# Patient Record
Sex: MALE | Race: WHITE | NOT HISPANIC OR LATINO | Employment: OTHER | URBAN - METROPOLITAN AREA
[De-identification: names, ages, dates, MRNs, and addresses within clinical notes are randomized per-mention and may not be internally consistent; named-entity substitution may affect disease eponyms.]

---

## 2022-02-16 ENCOUNTER — HOSPITAL ENCOUNTER (EMERGENCY)
Facility: HOSPITAL | Age: 52
Discharge: HOME | End: 2022-02-16
Attending: EMERGENCY MEDICINE | Admitting: EMERGENCY MEDICINE

## 2022-02-16 ENCOUNTER — APPOINTMENT (OUTPATIENT)
Dept: GENERAL RADIOLOGY | Facility: HOSPITAL | Age: 52
End: 2022-02-16

## 2022-02-16 VITALS
BODY MASS INDEX: 29.35 KG/M2 | SYSTOLIC BLOOD PRESSURE: 172 MMHG | OXYGEN SATURATION: 98 % | WEIGHT: 216.71 LBS | HEART RATE: 79 BPM | HEIGHT: 72 IN | RESPIRATION RATE: 18 BRPM | TEMPERATURE: 97.9 F | DIASTOLIC BLOOD PRESSURE: 96 MMHG

## 2022-02-16 DIAGNOSIS — S43.402A SPRAIN OF LEFT SHOULDER, INITIAL ENCOUNTER: Primary | ICD-10-CM

## 2022-02-16 PROCEDURE — 73030 X-RAY EXAM OF SHOULDER: CPT

## 2022-02-16 PROCEDURE — 99282 EMERGENCY DEPT VISIT SF MDM: CPT

## 2022-02-16 RX ORDER — METAXALONE 800 MG/1
TABLET ORAL
Qty: 15 TABLET | Refills: 0 | OUTPATIENT
Start: 2022-02-16 | End: 2022-04-02

## 2022-02-16 RX ORDER — PREDNISONE 10 MG/1
30 TABLET ORAL DAILY
Qty: 15 TABLET | Refills: 0 | OUTPATIENT
Start: 2022-02-16 | End: 2022-04-02

## 2022-02-16 NOTE — DISCHARGE INSTRUCTIONS
Use sling for the next 3 days  You can also use Tylenol or ibuprofen for pain  Avoid heavy lifting or pulling for the next 5 days  Follow-up with an orthopedic physician for continued problems, you may need an MRI if symptoms persist

## 2022-02-16 NOTE — ED PROVIDER NOTES
Subjective   51-year-old male states that he has pain in his shoulder.  He states it started after he slept on a cot at a relatives house.  He reports that he has had no decrease in sensation or circulation reports that he has had no impact trauma to the area.  He denies neck pain or stiffness.  He states he did research on the Internet and decided that he has a torn rotator cuff          Review of Systems   Musculoskeletal: Positive for arthralgias and joint swelling.   Neurological: Negative for tremors and numbness.   All other systems reviewed and are negative.      No past medical history on file.  No history of previous left shoulder surgery.  Right-hand-dominant  No Known Allergies    No past surgical history on file.    No family history on file.    Social History     Socioeconomic History   • Marital status: Single           Objective   Physical Exam  Nontoxic [place] Coma Scale 15  HEENT: Head is normocephalic atraumatic  Neck: Supple nontender no paresthesia with pressure  Shoulder/left upper extremity: The patient has diffuse discomfort noted on the superior portion of the shoulder.  There is no clinically apparent AC separation nor is there pain with pressure on the supraspinatus area although that is the location of the discomfort perceived by the patient.  There is no tenderness noted over the long head of the biceps.  The patient is able to maintain abduction without assistance at 90 and greater than 90 degrees.  He is neurovascular intact distally with symmetrical axillary and deltoid nerve function  Procedures       Patient was placed in a sling and was neurovascular intact afterwards    ED Course                                 Labs Reviewed - No data to display  Medications - No data to display  X-ray evaluation emergency department shows no evidence of dislocation or acute fracture.  There is some degenerative changes noted to the AC joint                MDM  Number of Diagnoses or Management  Options  Risk of Complications, Morbidity, and/or Mortality  Presenting problems: high  Diagnostic procedures: high  Management options: high  General comments: Patient will be placed on prednisone 30 mg daily for the next 5 days.  The patient placed on Skelaxin as needed for muscle spasm and pain.  He is encouraged to follow-up with orthopedist and asked orthopedist about MRI for continued symptoms.  The patient was stable at discharge and vocalized understanding of discharge instructions and warning        Final diagnoses:   Sprain of left shoulder, initial encounter       ED Disposition  ED Disposition     ED Disposition Condition Comment    Discharge Stable           [facility] [place] [id number]  [phone]        Orthopedist on-call Dr. Ovalle  [phone]             Medication List      New Prescriptions    metaxalone 800 MG tablet  Commonly known as: SKELAXIN  1 p.o. 3 times daily as needed muscle spasm and pain     predniSONE 10 MG tablet  Commonly known as: DELTASONE  Take 3 tablets by mouth Daily.           Where to Get Your Medications      These medications were sent to [facility]/pharmacy #[id number] - [facility] [place] - [address] [place] [address] [phone] [place] [phone] [address] [place] [address]    Hours: 24-hours Phone: [phone]   · metaxalone 800 MG tablet  · predniSONE 10 MG tablet          Bernabe Crowley MD  02/16/22 2024

## 2022-04-04 ENCOUNTER — TELEPHONE (OUTPATIENT)
Dept: URGENT CARE | Facility: CLINIC | Age: 52
End: 2022-04-04